# Patient Record
Sex: FEMALE | Race: WHITE | NOT HISPANIC OR LATINO | URBAN - METROPOLITAN AREA
[De-identification: names, ages, dates, MRNs, and addresses within clinical notes are randomized per-mention and may not be internally consistent; named-entity substitution may affect disease eponyms.]

---

## 2023-03-10 ENCOUNTER — EMERGENCY (EMERGENCY)
Facility: HOSPITAL | Age: 38
LOS: 1 days | Discharge: HOME | End: 2023-03-10
Admitting: EMERGENCY MEDICINE
Payer: COMMERCIAL

## 2023-03-10 VITALS
DIASTOLIC BLOOD PRESSURE: 72 MMHG | SYSTOLIC BLOOD PRESSURE: 106 MMHG | OXYGEN SATURATION: 98 % | RESPIRATION RATE: 15 BRPM | HEART RATE: 90 BPM

## 2023-03-10 VITALS
HEART RATE: 98 BPM | DIASTOLIC BLOOD PRESSURE: 67 MMHG | TEMPERATURE: 98 F | SYSTOLIC BLOOD PRESSURE: 106 MMHG | OXYGEN SATURATION: 98 % | RESPIRATION RATE: 20 BRPM

## 2023-03-10 LAB
ALBUMIN SERPL ELPH-MCNC: 3.2 G/DL — LOW (ref 3.4–5)
ALP SERPL-CCNC: 99 U/L — SIGNIFICANT CHANGE UP (ref 40–120)
ALT FLD-CCNC: 27 U/L — SIGNIFICANT CHANGE UP (ref 12–42)
ANION GAP SERPL CALC-SCNC: 6 MMOL/L — LOW (ref 9–16)
AST SERPL-CCNC: 26 U/L — SIGNIFICANT CHANGE UP (ref 15–37)
BASOPHILS # BLD AUTO: 0.02 K/UL — SIGNIFICANT CHANGE UP (ref 0–0.2)
BASOPHILS NFR BLD AUTO: 0.2 % — SIGNIFICANT CHANGE UP (ref 0–2)
BILIRUB SERPL-MCNC: 0.1 MG/DL — LOW (ref 0.2–1.2)
BUN SERPL-MCNC: 13 MG/DL — SIGNIFICANT CHANGE UP (ref 7–23)
CALCIUM SERPL-MCNC: 8.3 MG/DL — LOW (ref 8.5–10.5)
CHLORIDE SERPL-SCNC: 106 MMOL/L — SIGNIFICANT CHANGE UP (ref 96–108)
CO2 SERPL-SCNC: 25 MMOL/L — SIGNIFICANT CHANGE UP (ref 22–31)
CREAT SERPL-MCNC: 0.6 MG/DL — SIGNIFICANT CHANGE UP (ref 0.5–1.3)
EGFR: 117 ML/MIN/1.73M2 — SIGNIFICANT CHANGE UP
EOSINOPHIL # BLD AUTO: 0.05 K/UL — SIGNIFICANT CHANGE UP (ref 0–0.5)
EOSINOPHIL NFR BLD AUTO: 0.5 % — SIGNIFICANT CHANGE UP (ref 0–6)
ETHANOL SERPL-MCNC: <3 MG/DL — SIGNIFICANT CHANGE UP
GLUCOSE SERPL-MCNC: 121 MG/DL — HIGH (ref 70–99)
HCT VFR BLD CALC: 40.2 % — SIGNIFICANT CHANGE UP (ref 34.5–45)
HGB BLD-MCNC: 13.3 G/DL — SIGNIFICANT CHANGE UP (ref 11.5–15.5)
IMM GRANULOCYTES NFR BLD AUTO: 0.5 % — SIGNIFICANT CHANGE UP (ref 0–0.9)
LYMPHOCYTES # BLD AUTO: 1.24 K/UL — SIGNIFICANT CHANGE UP (ref 1–3.3)
LYMPHOCYTES # BLD AUTO: 13.1 % — SIGNIFICANT CHANGE UP (ref 13–44)
MAGNESIUM SERPL-MCNC: 1.7 MG/DL — SIGNIFICANT CHANGE UP (ref 1.6–2.6)
MCHC RBC-ENTMCNC: 31.2 PG — SIGNIFICANT CHANGE UP (ref 27–34)
MCHC RBC-ENTMCNC: 33.1 GM/DL — SIGNIFICANT CHANGE UP (ref 32–36)
MCV RBC AUTO: 94.4 FL — SIGNIFICANT CHANGE UP (ref 80–100)
MONOCYTES # BLD AUTO: 0.43 K/UL — SIGNIFICANT CHANGE UP (ref 0–0.9)
MONOCYTES NFR BLD AUTO: 4.5 % — SIGNIFICANT CHANGE UP (ref 2–14)
NEUTROPHILS # BLD AUTO: 7.7 K/UL — HIGH (ref 1.8–7.4)
NEUTROPHILS NFR BLD AUTO: 81.2 % — HIGH (ref 43–77)
NRBC # BLD: 0 /100 WBCS — SIGNIFICANT CHANGE UP (ref 0–0)
PLATELET # BLD AUTO: 207 K/UL — SIGNIFICANT CHANGE UP (ref 150–400)
POTASSIUM SERPL-MCNC: 3.9 MMOL/L — SIGNIFICANT CHANGE UP (ref 3.5–5.3)
POTASSIUM SERPL-SCNC: 3.9 MMOL/L — SIGNIFICANT CHANGE UP (ref 3.5–5.3)
PROT SERPL-MCNC: 6.4 G/DL — SIGNIFICANT CHANGE UP (ref 6.4–8.2)
RBC # BLD: 4.26 M/UL — SIGNIFICANT CHANGE UP (ref 3.8–5.2)
RBC # FLD: 12.3 % — SIGNIFICANT CHANGE UP (ref 10.3–14.5)
SODIUM SERPL-SCNC: 137 MMOL/L — SIGNIFICANT CHANGE UP (ref 132–145)
WBC # BLD: 9.49 K/UL — SIGNIFICANT CHANGE UP (ref 3.8–10.5)
WBC # FLD AUTO: 9.49 K/UL — SIGNIFICANT CHANGE UP (ref 3.8–10.5)

## 2023-03-10 PROCEDURE — 99284 EMERGENCY DEPT VISIT MOD MDM: CPT

## 2023-03-10 RX ORDER — FAMOTIDINE 10 MG/ML
20 INJECTION INTRAVENOUS ONCE
Refills: 0 | Status: COMPLETED | OUTPATIENT
Start: 2023-03-10 | End: 2023-03-10

## 2023-03-10 RX ORDER — SODIUM CHLORIDE 9 MG/ML
1000 INJECTION, SOLUTION INTRAVENOUS ONCE
Refills: 0 | Status: COMPLETED | OUTPATIENT
Start: 2023-03-10 | End: 2023-03-10

## 2023-03-10 RX ORDER — ONDANSETRON 8 MG/1
4 TABLET, FILM COATED ORAL ONCE
Refills: 0 | Status: COMPLETED | OUTPATIENT
Start: 2023-03-10 | End: 2023-03-10

## 2023-03-10 RX ADMIN — SODIUM CHLORIDE 1000 MILLILITER(S): 9 INJECTION, SOLUTION INTRAVENOUS at 21:24

## 2023-03-10 RX ADMIN — ONDANSETRON 4 MILLIGRAM(S): 8 TABLET, FILM COATED ORAL at 21:24

## 2023-03-10 RX ADMIN — FAMOTIDINE 20 MILLIGRAM(S): 10 INJECTION INTRAVENOUS at 21:24

## 2023-03-10 NOTE — ED ADULT NURSE NOTE - OBJECTIVE STATEMENT
36 y/o female patient here for eval of syncopal episode s/p taking an edible THC. patient is accomp. w/ . patient is alert verbal oriented x3 able to make needs known. labs drawn and medications provided. pending sobriety and reeval.

## 2023-03-10 NOTE — ED PROVIDER NOTE - OBJECTIVE STATEMENT
38 yo F with no known PMHx, BIBA for syncope episode during dinner tonight. Pt was with her spouse at dinner, admits to having 1 alcoholic drink and 25mg of edible prior to dinner, noted feeling nausea and lightheaded. Spouse found her "spaced out and slumped over without completely losing her consciousness." Noted episode of NBNB emesis, caught her and tried to get her up, but pt almost syncopized and reports generalized weakness. Noted 2 episodes of NBNB en route to the ED as well. Denies fever, chills, head trauma, CP, SOB, palpitations, hematemesis, D/C, abdominal pain, HA, dizziness, change in vision/hearing, focal weakness, incontinence, seizure activities, rash, and paresthesia.  No other co-ingestion reported

## 2023-03-10 NOTE — ED PROVIDER NOTE - CLINICAL SUMMARY MEDICAL DECISION MAKING FREE TEXT BOX
pt p/w witnessed syncope at dinner with spouse tonight. No associated trauma, neurologically intact on exam, Noted edible use, otherwise exam unremarkable, EKG non ischemic, labs with no electrolyte derangement, sx improved s/p IVF and GI cocktail, currently tolerating po without N/V. orthostatic negative, temp improved s/p external rewarming. no s/s of infection on exam. Based on my personal interpretation of pt's labs/images and entire work up during the ER stay, results, ddx, and f/u plans discussed in length with pt at bedside. AFVSS and pt non-toxic appearing. D/c'd home to f/u with PMD, counseling provided, strict return precautions discussed, prompt return to ER for any worsening or new sx, pt and spouse verbalized understanding.

## 2023-03-10 NOTE — ED PROVIDER NOTE - PATIENT PORTAL LINK FT
You can access the FollowMyHealth Patient Portal offered by [facility] by registering at the following website: [email or web address]. By joining [employer]’s FollowMyHealth portal, you will also be able to view your health information using other applications (apps) compatible with our system.

## 2023-03-10 NOTE — ED PROVIDER NOTE - NSFOLLOWUPINSTRUCTIONS_ED_ALL_ED_FT
Syncope    Syncope is when you temporarily lose consciousness, also called fainting or passing out. It is caused by a sudden decrease in blood flow to the brain. Even though most causes of syncope are not dangerous, syncope can possibly be a sign of a serious medical problem. Signs that you may be about to faint include feeling dizzy, lightheaded, nausea, visual changes, or cold/clammy skin. Do not drive, operate heavy machinery, or play sports until your health care provider says it is okay.    SEEK IMMEDIATE MEDICAL CARE IF YOU HAVE ANY OF THE FOLLOWING SYMPTOMS: severe headache, pain in your chest/abdomen/back, bleeding from your mouth or rectum, palpitations, shortness of breath, pain with breathing, seizure, confusion, or trouble walking.      Preventing Marijuana Misuse      Marijuana is a mixture of the dried leaves and flowers of the hemp plant Cannabis sativa. The plant's active ingredients (cannabinoids) change the chemistry of the brain. If you smoke or eat marijuana, you will experience changes in the way you think, feel, and behave.      What is marijuana used for?    In some cases, marijuana is prescribed by a health care provider (medical marijuana) for temporary relief from a medical condition. It can have medical effects, such as:  •Reduced nausea.       •Increased appetite.       •Reduced muscle spasm.       •Pain relief.      •Anxiety relief.      Many people use marijuana for recreational purposes because it helps them relax and puts them in a pleasurable mood (marijuana high).      How can marijuana use affect me?    Marijuana affects you both mentally and physically. Using marijuana can make you feel high and relaxed. It can also have negative effects, both short term and long term. When used for recreational purposes, marijuana is often used in higher doses and for longer periods. High doses of marijuana can cause unpleasant side effects. It is also possible to become addicted to this drug.    Short-term effects of marijuana use include:  •Changes in mood and perception, such as an altered sense of time.      •Increased heart rate.      •Increased appetite.      •Slowed movement, coordination, and reaction time.      •Poor memory, judgment, and problem-solving ability.      •Drowsiness.      •Bloodshot eyes and changes in vision.      •Coughing.      High doses of marijuana can cause:  •Panic.       •Anxiety.       •Mental confusion.      •Severe dizziness.      •Vomiting.      •Hallucinations. This means you see, hear, taste, smell, or feel things that are not real.      •Coma.        What can happen if I keep using marijuana?    If you use marijuana for a long time, it can have long-term effects such as:•Higher risk of health problems, such as:   •Lung and breathing problems.       •Possible higher risk of heart and cardiovascular problems or testicular cancer.        •Mental and physical dependence (addiction).      •Slowed brain development in young people. Babies whose mothers used marijuana during pregnancy may have an increased risk of problems with brain development and behavior.      •Hallucinations or temporary periods of false perceptions or beliefs (paranoia).      •Worsening of mental illness or onset of new mental illness. This can include anxiety, depression, or suicidal thoughts.      •Difficulty maintaining healthy relationships.       •Poor memory and difficulty concentrating and learning. This can result in decreased intelligence, poor performance at school or work, and an increased risk of dropping out of school.      •Higher risk of using other substances like alcohol, nicotine, and illegal drugs.      •A condition called cannabinoid hyperemesis syndrome. This causes repeated episodes of intense abdominal pain, nausea, and vomiting.      Quitting marijuana after using it for a long time can cause withdrawal symptoms, such as:  •Headache.       •Shakiness.       •Cravings for the drug.       •Sweating.       •Stomach pain, nausea, and vomiting.      •Restlessness and trouble sleeping.       •Anxiety, irritability, and anger.       •Decreased appetite.        What are the benefits of not using marijuana?    Not using marijuana can keep you from becoming dependent on it. You can avoid the drug's negative effects on your quality of life. You can avoid accidents caused by the slowed reaction time and decreased thinking ability that are common with marijuana use and abuse. You can also prevent the long-term effects that this drug can cause.      What actions can I take to stop using marijuana?     If you are not physically or mentally dependent on marijuana, you should be able to stop using it on your own. Taking these actions may help:  •Find healthy ways to cope with stress, such as exercise, meditation, or spending time with family and friends. Talk with your health care provider about how you feel and how to cope with stress.       •Spend time with people who do not use marijuana, or make new friends who do not use marijuana.       •Do something else instead of using marijuana. You can exercise, take up a hobby, or participate in activities that you can do with others.       •Do not be afraid to say no if someone offers you marijuana. Speak up about why you do not want to use drugs. You can be a positive role model for others.      If you cannot stop on your own, ask your health care provider for help. Treatment for marijuana addiction is similar to treatment for other addictions. It may include:  •Cognitive-behavioral therapy (psychotherapy). This may include individual or group therapy.      •Joining a support group.       •Treating medical, behavioral, or mental health conditions that exist along with marijuana dependency.        Where to find more information    Learn more about:  •Marijuana from the [place] National [place] on Drug Abuse: www.drugabuse.gov      •Medical marijuana from the National Institutes of Health: nccih.nih.gov      •Treatment options from the Substance Abuse and Mental Health Services Administration: sama.gov      •Recovery from marijuana dependency from Recovery.org: www.recovery.org        Contact a health care provider if:    •You want to stop using marijuana but you cannot.      •You have withdrawal symptoms when you try to stop using marijuana.      •You are using marijuana every day.      •You need to use increasing amounts of marijuana to get the same desired effect.      •You are using marijuana along with other drugs like cocaine or alcohol.      •You have anxiety or depression.      •You have hallucinations or paranoia.      •Marijuana use is interfering with your relationships or your ability to function normally at school or at work.        Get help right away if:    •You develop severe chest pain, dizziness, or shortness of breath.      •You have severe abdominal pain, nausea, and vomiting.        Summary    •Using marijuana can make you feel high and relaxed, and if used properly, it can have some medical benefits. However, it can also have negative effects, both short term and long term.      •High doses of marijuana can cause unpleasant side effects. These can be both physical and mental.      •Marijuana has the potential to cause physical dependence and even addiction.      •Long-term use may interfere with your ability to function normally at home, school, or work. It can sometimes lead to using other substances like alcohol, nicotine, and illegal drugs.      •If you need help to stop using marijuana, ask your health care provider. Marijuana addiction can be treated.

## 2023-03-10 NOTE — ED ADULT NURSE NOTE - CHIEF COMPLAINT QUOTE
Pt brought in EMS from a restaurant after a syncopal episode from a seated position. Pt caught by her , no injuries reported. Pt's  states the pt took 25 mg edible tonight. Pt reports she feels her heart racing and nausea. Vomit noted on her sweater.

## 2023-03-10 NOTE — ED ADULT NURSE NOTE - NSIMPLEMENTINTERV_GEN_ALL_ED
Implemented All Fall Risk Interventions:  [place] to call system. Call bell, personal items and telephone within reach. Instruct patient to call for assistance. Room bathroom lighting operational. Non-slip footwear when patient is off stretcher. Physically safe environment: no spills, clutter or unnecessary equipment. Stretcher in lowest position, wheels locked, appropriate side rails in place. Provide visual cue, wrist band, yellow gown, etc. Monitor gait and stability. Monitor for mental status changes and reorient to person, place, and time. Review medications for side effects contributing to fall risk. Reinforce activity limits and safety measures with patient and family.

## 2023-03-10 NOTE — ED PROVIDER NOTE - PHYSICAL EXAMINATION
Gen - WDWN F, somnolent appearing, responsive to verbal stimuli, no respiratory distress   Skin - warm, dry, intact   HEENT - AT/NC, Pupils 3mmb/l no nasal discharge, airway patent, neck supple and FROM  CV - S1S2, R/R/R  Resp - CTAB, no r/r/w  GI - soft, ND, NT, no CVAT b/l   MS - No acute or gross deformities noted to extremities. No midline spinal tenderness or step off on palpation  Neuro - AxOx3, CN II-XII grossly intact, able to ambulate with assistance

## 2023-03-13 DIAGNOSIS — R55 SYNCOPE AND COLLAPSE: ICD-10-CM

## 2023-03-13 DIAGNOSIS — M62.81 MUSCLE WEAKNESS (GENERALIZED): ICD-10-CM

## 2023-03-13 DIAGNOSIS — R42 DIZZINESS AND GIDDINESS: ICD-10-CM

## 2023-03-13 DIAGNOSIS — R11.2 NAUSEA WITH VOMITING, UNSPECIFIED: ICD-10-CM

## 2023-03-13 DIAGNOSIS — F12.129 CANNABIS ABUSE WITH INTOXICATION, UNSPECIFIED: ICD-10-CM
